# Patient Record
(demographics unavailable — no encounter records)

---

## 2024-11-15 NOTE — ASSESSMENT
[FreeTextEntry1] : Rodger Castaneda presents for follow-up. He continues to have nasal congestion, snoring, and witnessed apnea episodes consistent with obstructive sleep apnea. He has bilateral tonsillar hypertrophy on exam. His mother and I discussed tonsillectomy and adenoidectomy. R/b/a discussed. Possible complications including but not limited to infection, pain, velopharyngeal insufficiency, bleeding, complications from anesthesia, and need for further surgery were discussed. All questions were answered. She wishes to proceed.  - preop labs - f/u 3 weeks postop.

## 2024-11-15 NOTE — HISTORY OF PRESENT ILLNESS
[de-identified] : Rodger Castaneda is a 3 yo male who presents for evaluation. His mother notes snoring, restless sleeping, dry mouth, and chronic nasal congestion. He was seen by outside ENT and he was started on flonase sensimist which did not help. He does not get frequent rhinorrhea. His mother has noted pauses in breathing at night. He does have daytime sleepiness. No recent fevers or chills. No history of recurrnet ear infections. [de-identified] : 11/15/24 - Rodger presents for follow-up. He is snoring with witnessed apnea episodes. He has chronic nasal congestion. His mother denies rhinorrhea or recurrent sinus infections. He has not had allergy testing. He has had two episodes of strep tonsillitis. He has had 1-2 ear infections. He does not have dyspnea during the day but he has daytime fatigue. No recent fevers.

## 2025-01-24 NOTE — ASSESSMENT
[FreeTextEntry1] : Rodger Castaneda presents for post op. He is s/p tonsillectomy and adenoidectomy on 1/6/25. Pathology reviewed and is unremarkable. He is doing well. His pre-operative symptoms of snoring and nasal congestion have improved.    - follow up prn  Patient was seen with Dr. Bautista

## 2025-01-24 NOTE — HISTORY OF PRESENT ILLNESS
[de-identified] : Rodger Castaneda is a 1 yo male who presents for evaluation. His mother notes snoring, restless sleeping, dry mouth, and chronic nasal congestion. He was seen by outside ENT and he was started on flonase sensimist which did not help. He does not get frequent rhinorrhea. His mother has noted pauses in breathing at night. He does have daytime sleepiness. No recent fevers or chills. No history of recurrnet ear infections.   [de-identified] : 11/15/24 - Rodger presents for follow-up. He is snoring with witnessed apnea episodes. He has chronic nasal congestion. His mother denies rhinorrhea or recurrent sinus infections. He has not had allergy testing. He has had two episodes of strep tonsillitis. He has had 1-2 ear infections. He does not have dyspnea during the day but he has daytime fatigue. No recent fevers.  1/24/25 - Rodger presents for post op evaluation. He is s/p tonsillectomy and adenoidectomy on 1/6/25. He has not had any recent fevers or bleeding. He is eating well. He is no longer snoring with no witnessed apnea. He has had improvement with mouth breathing. He is doing well.

## 2025-01-24 NOTE — DATA REVIEWED
[FreeTextEntry1] : Pathology  Collected Date/Time:                   1/6/2025 08:01 EST Received Date/Time:                    1/6/2025 22:58 EST  Surgical Pathology Report - Auth (Verified)  Specimen(s) Submitted 1  Tonsils  Final Diagnosis  1.  Tonsils, bilateral tonsillectomy: -   Two palatine tonsils; macroscopic examination only

## 2025-01-24 NOTE — PHYSICAL EXAM
[Surgically Absent] : surgically absent [Clear to Auscultation] : lungs were clear to auscultation bilaterally [Normal Gait and Station] : normal gait and station [Normal muscle strength, symmetry and tone of facial, head and neck musculature] : normal muscle strength, symmetry and tone of facial, head and neck musculature [Normal] : no cervical lymphadenopathy [Age Appropriate Behavior] : age appropriate behavior [Cooperative] : cooperative [Exposed Vessel] : left anterior vessel not exposed [Wheezing] : no wheezing [Increased Work of Breathing] : no increased work of breathing with use of accessory muscles and retractions [de-identified] : white eschar bilateral oropharynx